# Patient Record
Sex: FEMALE | Race: WHITE
[De-identification: names, ages, dates, MRNs, and addresses within clinical notes are randomized per-mention and may not be internally consistent; named-entity substitution may affect disease eponyms.]

---

## 2018-06-08 ENCOUNTER — HOSPITAL ENCOUNTER (EMERGENCY)
Dept: HOSPITAL 91 - FTE | Age: 38
Discharge: HOME | End: 2018-06-08
Payer: MEDICAID

## 2018-06-08 ENCOUNTER — HOSPITAL ENCOUNTER (EMERGENCY)
Age: 38
Discharge: HOME | End: 2018-06-08

## 2018-06-08 DIAGNOSIS — N12: Primary | ICD-10-CM

## 2018-06-08 LAB
URINE BLOOD (DIP) POC: (no result)
URINE LEUKOCYTE EST (DIP) POC: (no result)
URINE PH (DIP) POC: 8.5 (ref 5–8.5)
URINE TOTAL PROTEIN POC: (no result)

## 2018-06-08 PROCEDURE — 81003 URINALYSIS AUTO W/O SCOPE: CPT

## 2018-06-08 PROCEDURE — 81025 URINE PREGNANCY TEST: CPT

## 2018-06-08 PROCEDURE — 99284 EMERGENCY DEPT VISIT MOD MDM: CPT

## 2018-06-08 PROCEDURE — 96372 THER/PROPH/DIAG INJ SC/IM: CPT

## 2018-06-08 PROCEDURE — 87086 URINE CULTURE/COLONY COUNT: CPT

## 2018-06-08 RX ADMIN — CEFTRIAXONE SODIUM 1 GM: 1 INJECTION, POWDER, FOR SOLUTION INTRAMUSCULAR; INTRAVENOUS at 12:30

## 2018-06-08 RX ADMIN — LIDOCAINE HYDROCHLORIDE 1 ML: 10 INJECTION, SOLUTION INFILTRATION; PERINEURAL at 12:30

## 2019-04-12 ENCOUNTER — HOSPITAL ENCOUNTER (INPATIENT)
Dept: HOSPITAL 10 - L-D | Age: 39
LOS: 6 days | Discharge: HOME | End: 2019-04-18
Attending: OBSTETRICS & GYNECOLOGY | Admitting: OBSTETRICS & GYNECOLOGY
Payer: MEDICAID

## 2019-04-12 ENCOUNTER — HOSPITAL ENCOUNTER (INPATIENT)
Dept: HOSPITAL 91 - L-D | Age: 39
LOS: 6 days | Discharge: HOME | End: 2019-04-18
Payer: MEDICAID

## 2019-04-12 VITALS
HEIGHT: 67 IN | BODY MASS INDEX: 33.56 KG/M2 | WEIGHT: 213.85 LBS | WEIGHT: 213.85 LBS | BODY MASS INDEX: 33.56 KG/M2 | HEIGHT: 67 IN

## 2019-04-12 VITALS — RESPIRATION RATE: 18 BRPM | DIASTOLIC BLOOD PRESSURE: 67 MMHG | HEART RATE: 81 BPM | SYSTOLIC BLOOD PRESSURE: 108 MMHG

## 2019-04-12 DIAGNOSIS — K83.1: ICD-10-CM

## 2019-04-12 DIAGNOSIS — O32.2XX0: ICD-10-CM

## 2019-04-12 DIAGNOSIS — Z30.2: ICD-10-CM

## 2019-04-12 DIAGNOSIS — Z3A.36: ICD-10-CM

## 2019-04-12 LAB
ADD MAN DIFF?: NO
ALANINE AMINOTRANSFERASE: 61 IU/L (ref 13–69)
ALBUMIN/GLOBULIN RATIO: 1.08
ALBUMIN: 3.7 G/DL (ref 3.3–4.9)
ALKALINE PHOSPHATASE: 151 IU/L (ref 42–121)
ANION GAP: 8 (ref 5–13)
ASPARTATE AMINO TRANSFERASE: 33 IU/L (ref 15–46)
BASOPHIL #: 0 10^3/UL (ref 0–0.1)
BASOPHILS %: 0.4 % (ref 0–2)
BILIRUBIN,DIRECT: 0 MG/DL (ref 0–0.2)
BILIRUBIN,TOTAL: 0.4 MG/DL (ref 0.2–1.3)
BLOOD UREA NITROGEN: 7 MG/DL (ref 7–20)
CALCIUM: 9.1 MG/DL (ref 8.4–10.2)
CARBON DIOXIDE: 23 MMOL/L (ref 21–31)
CHLORIDE: 104 MMOL/L (ref 97–110)
CREATININE: 0.53 MG/DL (ref 0.44–1)
EOSINOPHILS #: 0.1 10^3/UL (ref 0–0.5)
EOSINOPHILS %: 0.8 % (ref 0–7)
GLOBULIN: 3.4 G/DL (ref 1.3–3.2)
GLUCOSE: 78 MG/DL (ref 70–220)
HEMATOCRIT: 34.7 % (ref 37–47)
HEMOGLOBIN: 11.3 G/DL (ref 12–16)
HEPATITIS B SURFACE ANTIGEN: NEGATIVE
IMMATURE GRANS #M: 0.14 10^3/UL (ref 0–0.03)
IMMATURE GRANS % (M): 1.3 % (ref 0–0.43)
INR: 0.88
LYMPHOCYTES #: 2.5 10^3/UL (ref 0.8–2.9)
LYMPHOCYTES %: 23.9 % (ref 15–51)
MEAN CORPUSCULAR HEMOGLOBIN: 25.9 PG (ref 29–33)
MEAN CORPUSCULAR HGB CONC: 32.6 G/DL (ref 32–37)
MEAN CORPUSCULAR VOLUME: 79.6 FL (ref 82–101)
MEAN PLATELET VOLUME: 10.5 FL (ref 7.4–10.4)
MONOCYTE #: 0.7 10^3/UL (ref 0.3–0.9)
MONOCYTES %: 6.8 % (ref 0–11)
NEUTROPHIL #: 7 10^3/UL (ref 1.6–7.5)
NEUTROPHILS %: 66.8 % (ref 39–77)
NUCLEATED RED BLOOD CELLS #: 0 10^3/UL (ref 0–0)
NUCLEATED RED BLOOD CELLS%: 0 /100WBC (ref 0–0)
PARTIAL THROMBOPLASTIN TIME: 24.7 SEC (ref 23–35)
PLATELET COUNT: 355 10^3/UL (ref 140–415)
POTASSIUM: 3.5 MMOL/L (ref 3.5–5.1)
PROTIME: 12 SEC (ref 11.9–14.9)
PT RATIO: 0.9
RED BLOOD COUNT: 4.36 10^6/UL (ref 4.2–5.4)
RED CELL DISTRIBUTION WIDTH: 12.9 % (ref 11.5–14.5)
SODIUM: 135 MMOL/L (ref 135–144)
TOTAL PROTEIN: 7.1 G/DL (ref 6.1–8.1)
WHITE BLOOD COUNT: 10.5 10^3/UL (ref 4.8–10.8)

## 2019-04-12 PROCEDURE — 85610 PROTHROMBIN TIME: CPT

## 2019-04-12 PROCEDURE — 88302 TISSUE EXAM BY PATHOLOGIST: CPT

## 2019-04-12 PROCEDURE — 86900 BLOOD TYPING SEROLOGIC ABO: CPT

## 2019-04-12 PROCEDURE — 86850 RBC ANTIBODY SCREEN: CPT

## 2019-04-12 PROCEDURE — 76818 FETAL BIOPHYS PROFILE W/NST: CPT

## 2019-04-12 PROCEDURE — 86592 SYPHILIS TEST NON-TREP QUAL: CPT

## 2019-04-12 PROCEDURE — 76815 OB US LIMITED FETUS(S): CPT

## 2019-04-12 PROCEDURE — 86901 BLOOD TYPING SEROLOGIC RH(D): CPT

## 2019-04-12 PROCEDURE — 85730 THROMBOPLASTIN TIME PARTIAL: CPT

## 2019-04-12 PROCEDURE — 87340 HEPATITIS B SURFACE AG IA: CPT

## 2019-04-12 PROCEDURE — 85025 COMPLETE CBC W/AUTO DIFF WBC: CPT

## 2019-04-12 PROCEDURE — 80053 COMPREHEN METABOLIC PANEL: CPT

## 2019-04-12 RX ADMIN — URSODIOL 1 MG: 300 CAPSULE ORAL at 23:33

## 2019-04-12 RX ADMIN — BETAMETHASONE SODIUM PHOSPHATE AND BETAMETHASONE ACETATE SCH MG: 3; 3 INJECTION, SUSPENSION INTRA-ARTICULAR; INTRALESIONAL; INTRAMUSCULAR at 22:00

## 2019-04-12 RX ADMIN — HYDROCORTISONE 1 APPLIC: 1 OINTMENT TOPICAL at 23:34

## 2019-04-12 RX ADMIN — DIPHENHYDRAMINE HYDROCHLORIDE 1 MG: 25 CAPSULE ORAL at 23:55

## 2019-04-12 RX ADMIN — URSODIOL SCH MG: 300 CAPSULE ORAL at 23:33

## 2019-04-12 RX ADMIN — BETAMETHASONE SODIUM PHOSPHATE AND BETAMETHASONE ACETATE 1 MG: 3; 3 INJECTION, SUSPENSION INTRA-ARTICULAR; INTRALESIONAL; INTRAMUSCULAR at 22:00

## 2019-04-12 RX ADMIN — DIPHENHYDRAMINE HYDROCHLORIDE SCH MG: 25 CAPSULE ORAL at 23:55

## 2019-04-12 RX ADMIN — PYRIDOXINE HYDROCHLORIDE SCH MLS/HR: 100 INJECTION, SOLUTION INTRAMUSCULAR; INTRAVENOUS at 22:31

## 2019-04-12 RX ADMIN — PYRIDOXINE HYDROCHLORIDE 1 MLS/HR: 100 INJECTION, SOLUTION INTRAMUSCULAR; INTRAVENOUS at 22:31

## 2019-04-12 NOTE — PREOPHP
DATE OF ADMISSION: 2019

 

HISTORY OF PRESENT ILLNESS:  Ms. Thi Shaffer is a 38-year-old  4, para 3, EDC 2019 
intrauterine pregnancy at 35 weeks and 4 days gestational age, was sent from [facility] t
[facility] to be delivered at 36 weeks secondary to cholestasis of pregnancy.  Currently, the patient has m
ild generalized itching.  She declines any nausea, vomiting, shortness of breath, visual change, or e
pigastric pain.  Her vital signs are stable.  Her prenatal care took place at [facility].


 

PAST MEDICAL HISTORY:  None.

 

MEDICATIONS:  Prenatal vitamins, Actigall, Benadryl, hydrocortisone cream 1%.

 

PAST SURGICAL HISTORY:  None.

 

OBSTETRIC HISTORY:  x3 vaginal deliveries.

 

GYNECOLOGIC HISTORY:  12, regular 3 to 4 days.  Denies any sexually transmitted infections.  Sexually
 active with 1 partner.

 

SOCIAL HISTORY:  Denies any smoking, drugs or alcohol.

 

FAMILY HISTORY:  None.

 

REVIEW OF SYSTEMS:  All within normal except for history of present illness.

 

PHYSICAL EXAMINATION:

HEENT:  Within normal.

LUNGS:  CTA bilateral.

CARDIOVASCULAR:  S1, S2, regular rhythm.

ABDOMEN:  Gravid, nontender.  Negative CVA bilateral.

EXTREMITIES:  Negative edema.  No calf tenderness.

PELVIC:  Vaginal exam deferred.  Fetal heart tracing category 1.

 

SIGNIFICANT LABORATORIES:  Total bile acids 267.8, result from 04/10/2019.

 

ASSESSMENT:  Intrauterine pregnancy at 35 weeks and 4 days gestational age with cholestasis of pregna
ncy.

 

PLAN:  The patient's evaluation was discussed with Dr. Booth, who recommended delivery at 36 weeks an
d continuous fetal monitoring inhouse until delivery.  Plan is to give a steroid treatment for fetal 
lung maturity, continue Actigall, biophysical profile, EFW and fetal presentation.

 

 

Dictated By: ROSANNA RONDON/ISABELLE

DD:    2019 20:26:45

DT:    2019 21:05:09

Conf#: [id number]

DID#:  [id number]

## 2019-04-13 LAB — RAPID PLASMA REAGIN: NONREACTIVE

## 2019-04-13 RX ADMIN — FERROUS SULFATE TAB 325 MG (65 MG ELEMENTAL FE) 1 MG: 325 (65 FE) TAB at 08:18

## 2019-04-13 RX ADMIN — FERROUS SULFATE TAB 325 MG (65 MG ELEMENTAL FE) SCH MG: 325 (65 FE) TAB at 08:18

## 2019-04-13 RX ADMIN — Medication SCH TAB: at 08:18

## 2019-04-13 RX ADMIN — DIPHENHYDRAMINE HYDROCHLORIDE 1 MG: 25 CAPSULE ORAL at 05:56

## 2019-04-13 RX ADMIN — BETAMETHASONE SODIUM PHOSPHATE AND BETAMETHASONE ACETATE SCH MG: 3; 3 INJECTION, SUSPENSION INTRA-ARTICULAR; INTRALESIONAL; INTRAMUSCULAR at 22:21

## 2019-04-13 RX ADMIN — URSODIOL SCH MG: 300 CAPSULE ORAL at 13:04

## 2019-04-13 RX ADMIN — URSODIOL SCH MG: 300 CAPSULE ORAL at 08:18

## 2019-04-13 RX ADMIN — PYRIDOXINE HYDROCHLORIDE 1 MLS/HR: 100 INJECTION, SOLUTION INTRAMUSCULAR; INTRAVENOUS at 11:37

## 2019-04-13 RX ADMIN — PYRIDOXINE HYDROCHLORIDE 1 MLS/HR: 100 INJECTION, SOLUTION INTRAMUSCULAR; INTRAVENOUS at 03:13

## 2019-04-13 RX ADMIN — URSODIOL 1 MG: 300 CAPSULE ORAL at 13:04

## 2019-04-13 RX ADMIN — DIPHENHYDRAMINE HYDROCHLORIDE 1 MG: 25 CAPSULE ORAL at 11:55

## 2019-04-13 RX ADMIN — PYRIDOXINE HYDROCHLORIDE SCH MLS/HR: 100 INJECTION, SOLUTION INTRAMUSCULAR; INTRAVENOUS at 11:37

## 2019-04-13 RX ADMIN — BETAMETHASONE SODIUM PHOSPHATE AND BETAMETHASONE ACETATE 1 MG: 3; 3 INJECTION, SUSPENSION INTRA-ARTICULAR; INTRALESIONAL; INTRAMUSCULAR at 22:21

## 2019-04-13 RX ADMIN — DIPHENHYDRAMINE HYDROCHLORIDE 1 MG: 25 CAPSULE ORAL at 17:49

## 2019-04-13 RX ADMIN — Medication 1 TAB: at 08:18

## 2019-04-13 RX ADMIN — DIPHENHYDRAMINE HYDROCHLORIDE SCH MG: 25 CAPSULE ORAL at 17:49

## 2019-04-13 RX ADMIN — PYRIDOXINE HYDROCHLORIDE 1 MLS/HR: 100 INJECTION, SOLUTION INTRAMUSCULAR; INTRAVENOUS at 21:08

## 2019-04-13 RX ADMIN — PYRIDOXINE HYDROCHLORIDE SCH MLS/HR: 100 INJECTION, SOLUTION INTRAMUSCULAR; INTRAVENOUS at 21:08

## 2019-04-13 RX ADMIN — DIPHENHYDRAMINE HYDROCHLORIDE SCH MG: 25 CAPSULE ORAL at 05:56

## 2019-04-13 RX ADMIN — URSODIOL 1 MG: 300 CAPSULE ORAL at 21:07

## 2019-04-13 RX ADMIN — PYRIDOXINE HYDROCHLORIDE SCH MLS/HR: 100 INJECTION, SOLUTION INTRAMUSCULAR; INTRAVENOUS at 03:13

## 2019-04-13 RX ADMIN — URSODIOL 1 MG: 300 CAPSULE ORAL at 08:18

## 2019-04-13 RX ADMIN — URSODIOL SCH MG: 300 CAPSULE ORAL at 21:07

## 2019-04-13 RX ADMIN — DIPHENHYDRAMINE HYDROCHLORIDE SCH MG: 25 CAPSULE ORAL at 11:55

## 2019-04-13 NOTE — CONS
DATE OF ADMISSION: 04/12/2019

DATE OF CONSULTATION:  04/12/2019

 

 

 

I was notified by Dr. Chavis around 5:00 about this patient.  She was 35 weeks and 4 days on 04/12
/2019 and her bile acids have just been resulted with the total bile acid of 267.  He mentioned liver
 function tests are normal and apparently the bile acids have been done fasting.  This is a very, clemente
y high level of bile acid.  I do recommend in-house management and continue with the Actigall.  Hossein
nuous fetal heart tone monitoring.  Delivery at 36 weeks unless there is any evidence of nonreassurin
g fetal heart tone.  The reason for delivery at 36 weeks is that also severe cholestasis, which incre
ases the risk of intrauterine fetal demise.

 

 

Dictated By: RADHIKA HAILE MD

 

ST/NTS

DD:    04/13/2019 01:22:09

DT:    04/13/2019 05:29:09

Conf#: [id number]

DID#:  [id number]

CC: ROSANNA CHAVIS MD;*EndCC*

## 2019-04-14 RX ADMIN — DIPHENHYDRAMINE HYDROCHLORIDE 1 MG: 25 CAPSULE ORAL at 06:18

## 2019-04-14 RX ADMIN — URSODIOL 1 MG: 300 CAPSULE ORAL at 22:00

## 2019-04-14 RX ADMIN — PYRIDOXINE HYDROCHLORIDE 1 MLS/HR: 100 INJECTION, SOLUTION INTRAMUSCULAR; INTRAVENOUS at 14:17

## 2019-04-14 RX ADMIN — DIPHENHYDRAMINE HYDROCHLORIDE 1 MG: 25 CAPSULE ORAL at 18:14

## 2019-04-14 RX ADMIN — PYRIDOXINE HYDROCHLORIDE SCH MLS/HR: 100 INJECTION, SOLUTION INTRAMUSCULAR; INTRAVENOUS at 14:17

## 2019-04-14 RX ADMIN — URSODIOL 1 MG: 300 CAPSULE ORAL at 13:04

## 2019-04-14 RX ADMIN — PYRIDOXINE HYDROCHLORIDE SCH MLS/HR: 100 INJECTION, SOLUTION INTRAMUSCULAR; INTRAVENOUS at 05:13

## 2019-04-14 RX ADMIN — PYRIDOXINE HYDROCHLORIDE SCH MLS/HR: 100 INJECTION, SOLUTION INTRAMUSCULAR; INTRAVENOUS at 22:01

## 2019-04-14 RX ADMIN — DIPHENHYDRAMINE HYDROCHLORIDE SCH MG: 25 CAPSULE ORAL at 00:09

## 2019-04-14 RX ADMIN — Medication 1 TAB: at 08:58

## 2019-04-14 RX ADMIN — PYRIDOXINE HYDROCHLORIDE 1 MLS/HR: 100 INJECTION, SOLUTION INTRAMUSCULAR; INTRAVENOUS at 05:13

## 2019-04-14 RX ADMIN — DIPHENHYDRAMINE HYDROCHLORIDE 1 MG: 25 CAPSULE ORAL at 23:49

## 2019-04-14 RX ADMIN — DIPHENHYDRAMINE HYDROCHLORIDE SCH MG: 25 CAPSULE ORAL at 18:14

## 2019-04-14 RX ADMIN — FERROUS SULFATE TAB 325 MG (65 MG ELEMENTAL FE) 1 MG: 325 (65 FE) TAB at 08:58

## 2019-04-14 RX ADMIN — URSODIOL 1 MG: 300 CAPSULE ORAL at 08:58

## 2019-04-14 RX ADMIN — URSODIOL SCH MG: 300 CAPSULE ORAL at 08:58

## 2019-04-14 RX ADMIN — URSODIOL SCH MG: 300 CAPSULE ORAL at 13:04

## 2019-04-14 RX ADMIN — PYRIDOXINE HYDROCHLORIDE 1 MLS/HR: 100 INJECTION, SOLUTION INTRAMUSCULAR; INTRAVENOUS at 22:01

## 2019-04-14 RX ADMIN — DIPHENHYDRAMINE HYDROCHLORIDE SCH MG: 25 CAPSULE ORAL at 06:18

## 2019-04-14 RX ADMIN — DIPHENHYDRAMINE HYDROCHLORIDE SCH MG: 25 CAPSULE ORAL at 12:07

## 2019-04-14 RX ADMIN — DIPHENHYDRAMINE HYDROCHLORIDE SCH MG: 25 CAPSULE ORAL at 23:49

## 2019-04-14 RX ADMIN — DIPHENHYDRAMINE HYDROCHLORIDE 1 MG: 25 CAPSULE ORAL at 00:09

## 2019-04-14 RX ADMIN — DIPHENHYDRAMINE HYDROCHLORIDE 1 MG: 25 CAPSULE ORAL at 12:07

## 2019-04-14 RX ADMIN — Medication SCH TAB: at 08:58

## 2019-04-14 RX ADMIN — URSODIOL SCH MG: 300 CAPSULE ORAL at 22:00

## 2019-04-14 RX ADMIN — FERROUS SULFATE TAB 325 MG (65 MG ELEMENTAL FE) SCH MG: 325 (65 FE) TAB at 08:58

## 2019-04-15 VITALS — SYSTOLIC BLOOD PRESSURE: 116 MMHG | HEART RATE: 98 BPM | RESPIRATION RATE: 18 BRPM | DIASTOLIC BLOOD PRESSURE: 56 MMHG

## 2019-04-15 LAB
ADD MAN DIFF?: NO
ALANINE AMINOTRANSFERASE: 78 IU/L (ref 13–69)
ALBUMIN/GLOBULIN RATIO: 1.06
ALBUMIN: 3.2 G/DL (ref 3.3–4.9)
ALKALINE PHOSPHATASE: 112 IU/L (ref 42–121)
ANION GAP: 4 (ref 5–13)
ASPARTATE AMINO TRANSFERASE: 41 IU/L (ref 15–46)
BASOPHIL #: 0 10^3/UL (ref 0–0.1)
BASOPHILS %: 0.4 % (ref 0–2)
BILIRUBIN,DIRECT: 0 MG/DL (ref 0–0.2)
BILIRUBIN,TOTAL: 0.2 MG/DL (ref 0.2–1.3)
BLOOD UREA NITROGEN: 8 MG/DL (ref 7–20)
CALCIUM: 8.7 MG/DL (ref 8.4–10.2)
CARBON DIOXIDE: 24 MMOL/L (ref 21–31)
CHLORIDE: 105 MMOL/L (ref 97–110)
CREATININE: 0.5 MG/DL (ref 0.44–1)
EOSINOPHILS #: 0.1 10^3/UL (ref 0–0.5)
EOSINOPHILS %: 0.5 % (ref 0–7)
GLOBULIN: 3 G/DL (ref 1.3–3.2)
GLUCOSE: 83 MG/DL (ref 70–220)
HEMATOCRIT: 29.8 % (ref 37–47)
HEMOGLOBIN: 9.8 G/DL (ref 12–16)
IMMATURE GRANS #M: 0.16 10^3/UL (ref 0–0.03)
IMMATURE GRANS % (M): 1.6 % (ref 0–0.43)
INR: 0.92
LYMPHOCYTES #: 2.6 10^3/UL (ref 0.8–2.9)
LYMPHOCYTES %: 25.7 % (ref 15–51)
MEAN CORPUSCULAR HEMOGLOBIN: 26.4 PG (ref 29–33)
MEAN CORPUSCULAR HGB CONC: 32.9 G/DL (ref 32–37)
MEAN CORPUSCULAR VOLUME: 80.3 FL (ref 82–101)
MEAN PLATELET VOLUME: 10.1 FL (ref 7.4–10.4)
MONOCYTE #: 1 10^3/UL (ref 0.3–0.9)
MONOCYTES %: 10.3 % (ref 0–11)
NEUTROPHIL #: 6.1 10^3/UL (ref 1.6–7.5)
NEUTROPHILS %: 61.5 % (ref 39–77)
NUCLEATED RED BLOOD CELLS #: 0 10^3/UL (ref 0–0)
NUCLEATED RED BLOOD CELLS%: 0 /100WBC (ref 0–0)
PARTIAL THROMBOPLASTIN TIME: 23 SEC (ref 23–35)
PLATELET COUNT: 349 10^3/UL (ref 140–415)
POTASSIUM: 3.5 MMOL/L (ref 3.5–5.1)
PROTIME: 12.5 SEC (ref 11.9–14.9)
PT RATIO: 1
RED BLOOD COUNT: 3.71 10^6/UL (ref 4.2–5.4)
RED CELL DISTRIBUTION WIDTH: 13.2 % (ref 11.5–14.5)
SODIUM: 133 MMOL/L (ref 135–144)
TOTAL PROTEIN: 6.2 G/DL (ref 6.1–8.1)
WHITE BLOOD COUNT: 9.9 10^3/UL (ref 4.8–10.8)

## 2019-04-15 PROCEDURE — 0UB70ZZ EXCISION OF BILATERAL FALLOPIAN TUBES, OPEN APPROACH: ICD-10-PCS

## 2019-04-15 PROCEDURE — 0UB70ZZ EXCISION OF BILATERAL FALLOPIAN TUBES, OPEN APPROACH: ICD-10-PCS | Performed by: OBSTETRICS & GYNECOLOGY

## 2019-04-15 RX ADMIN — URSODIOL SCH MG: 300 CAPSULE ORAL at 22:09

## 2019-04-15 RX ADMIN — URSODIOL 1 MG: 300 CAPSULE ORAL at 07:53

## 2019-04-15 RX ADMIN — URSODIOL 1 MG: 300 CAPSULE ORAL at 19:30

## 2019-04-15 RX ADMIN — PYRIDOXINE HYDROCHLORIDE SCH MLS/HR: 100 INJECTION, SOLUTION INTRAMUSCULAR; INTRAVENOUS at 15:24

## 2019-04-15 RX ADMIN — URSODIOL SCH MG: 300 CAPSULE ORAL at 07:53

## 2019-04-15 RX ADMIN — DIPHENHYDRAMINE HYDROCHLORIDE 1 MG: 50 INJECTION, SOLUTION INTRAMUSCULAR; INTRAVENOUS at 22:30

## 2019-04-15 RX ADMIN — FERROUS SULFATE TAB 325 MG (65 MG ELEMENTAL FE) SCH MG: 325 (65 FE) TAB at 07:54

## 2019-04-15 RX ADMIN — DIPHENHYDRAMINE HYDROCHLORIDE SCH MG: 25 CAPSULE ORAL at 19:31

## 2019-04-15 RX ADMIN — URSODIOL SCH MG: 300 CAPSULE ORAL at 19:30

## 2019-04-15 RX ADMIN — Medication 1 MLS/HR: at 19:05

## 2019-04-15 RX ADMIN — FERROUS SULFATE TAB 325 MG (65 MG ELEMENTAL FE) 1 MG: 325 (65 FE) TAB at 07:54

## 2019-04-15 RX ADMIN — KETOROLAC TROMETHAMINE 1 MG: 30 INJECTION, SOLUTION INTRAMUSCULAR at 20:34

## 2019-04-15 RX ADMIN — DIPHENHYDRAMINE HYDROCHLORIDE SCH MG: 25 CAPSULE ORAL at 05:57

## 2019-04-15 RX ADMIN — PYRIDOXINE HYDROCHLORIDE 1 MLS/HR: 100 INJECTION, SOLUTION INTRAMUSCULAR; INTRAVENOUS at 15:24

## 2019-04-15 RX ADMIN — Medication 1 MLS/HR: at 19:31

## 2019-04-15 RX ADMIN — Medication SCH TAB: at 07:53

## 2019-04-15 RX ADMIN — CEFAZOLIN SODIUM 1 MLS/HR: 2 SOLUTION INTRAVENOUS at 17:45

## 2019-04-15 RX ADMIN — PYRIDOXINE HYDROCHLORIDE 1 MLS/HR: 100 INJECTION, SOLUTION INTRAMUSCULAR; INTRAVENOUS at 05:58

## 2019-04-15 RX ADMIN — DIPHENHYDRAMINE HYDROCHLORIDE SCH MG: 25 CAPSULE ORAL at 19:30

## 2019-04-15 RX ADMIN — Medication SCH MLS/HR: at 22:09

## 2019-04-15 RX ADMIN — DIPHENHYDRAMINE HYDROCHLORIDE 1 MG: 25 CAPSULE ORAL at 19:31

## 2019-04-15 RX ADMIN — DIPHENHYDRAMINE HYDROCHLORIDE 1 MG: 25 CAPSULE ORAL at 19:30

## 2019-04-15 RX ADMIN — Medication 1 TAB: at 07:53

## 2019-04-15 RX ADMIN — PYRIDOXINE HYDROCHLORIDE SCH MLS/HR: 100 INJECTION, SOLUTION INTRAMUSCULAR; INTRAVENOUS at 05:58

## 2019-04-15 RX ADMIN — Medication 1 MLS/HR: at 22:09

## 2019-04-15 RX ADMIN — URSODIOL 1 MG: 300 CAPSULE ORAL at 22:09

## 2019-04-15 RX ADMIN — DIPHENHYDRAMINE HYDROCHLORIDE 1 MG: 25 CAPSULE ORAL at 05:57

## 2019-04-15 NOTE — OPPN
Date/Time of Note


Date/Time of Note


DATE: 4/15/19 


TIME: 18:35





Operative Report


Planned Procedure


Procedure date


Apr 15, 2019


Procedure(s)


primary low transverse CD, wit bilateral tubal salpingectomy


Performed by


see signature line


Assistant:  SORAYA AJ M.D.


2nd Assistant


none


Anesthesiologist:  JAMIL HEREDIA MD


Pre-procedure diagnosis


Intrauterine pregnancy at 36 weeks and  days gestational age with cholestasis of


pregnancy, fetal malpresentation, desire CD with btl


                 [id number]
Anesthesia Type:  Kebdm5b
spinal








Post-Procedure


Post-procedure diagnosis


same


Findings


a viable male apgar 8/9 weight 2,745 grams, transverse presentation (head 


towards left, back up), normal uterus tubes and ovaries


Estimated Blood Loss:  500 - 600 mls (500)


Specimen(s)


left and right fallopian tubes


Grafts/Implant(s)


none


Complication(s)


none











ROSANNA CHAVIS MD           Apr 15, 2019 18:38

## 2019-04-15 NOTE — PAC
Date/Time of Note


Date/Time of Note


DATE: 4/15/19 


TIME: 19:00





Post-Anesthesia Notes


Post-Anesthesia Note


Last documented vital signs





Vital Signs


  Date      Temp  Pulse  Resp  B/P (MAP)   Pulse Ox  O2          O2 Flow    FiO2


Time                                                 Delivery    Rate


   4/12/19  99.1     81    18      108/67            Room Air


     19:46                           (81)





Activity:  WNL


Respiratory function:  WNL


Cardiovascular function:  WNL


Mental status:  Baseline


Pain reasonably controlled:  Yes


Hydration appropriate:  Yes


Nausea/Vomiting absent:  Yes


Comments


BP:122/56, P:88, Spo2:100%, T:98,8











JAMIL HEREDIA MD              Apr 15, 2019 19:00

## 2019-04-15 NOTE — PREAC
Date/Time of Note


Date/Time of Note


DATE: 4/15/19 


TIME: 17:38





Anesthesia Eval and Record


Evaluation


Time Pre-Procedure Interview


DATE: 4/15/19 


TIME: 17:38


Age


38


Sex


female


NPO:  8 hrs


Preoperative diagnosis


IUP


Planned procedure


Csection





Past Medical History


Past Medical History:  None





Surgery & Anesthesia Issues


No known issue





Meds


Anticoagulation:  No


Beta Blocker within 24 hr:  No


Reason Beta Blocker not given:  Pt. not on B-Blocker


Active Scripts


Ciprofloxacin Hcl* (Ciprofloxacin Hcl*) 500 Mg Tablet, 500 MG PO BID for 10 


Days, TAB


   Prov:ALVAREZ NIXON PA-C         6/8/18


Reported Medications


Diphenhydramine Hcl (Banophen) 25 Mg Capsule, 25 MG PO Q6 for itching, CAP


   4/12/19


Ursodiol* (Actigall*) 300 Mg Cap, 300 MG PO TID, #90 CAP


   4/12/19


Prenatal Vits W-Ca,Fe,Fa(<1MG) (Prenatal) 1 Tab Tablet


   9/30/10





Current Medications


Lactated Ringer's 1,000 ml @  125 mls/hr Q8H IV  Last administered on 4/15/19at 


15:24; Admin Dose 125 MLS/HR;  Start 4/12/19 at 20:24


Prenat Multivit/ /Iron/Folic Ac (Prenatal) 1 tab DAILY PO  Last 


administered on 4/15/19at 07:53; Admin Dose 1 TAB;  Start 4/13/19 at 09:00


Ferrous Sulfate (Ferrous Sulfate (Ec)) 325 mg DAILY PO  Last administered on 


4/15/19at 07:54; Admin Dose 325 MG;  Start 4/13/19 at 09:00


Acetaminophen (Tylenol Tab) 650 mg Q4H  PRN PO .PAIN OR TEMP;  Start 4/12/19 at 


20:30


Diphenhydramine HCl (Benadryl) 25 mg Q6 PO  Last administered on 4/15/19at 


05:57; Admin Dose 25 MG;  Start 4/13/19 at 00:00


Ursodiol (Actigall) 300 mg TID PO  Last administered on 4/15/19at 07:53; Admin 


Dose 300 MG;  Start 4/12/19 at 22:00


Hydrocortisone (Hydrocortisone 1% Oint) 1 applic Q4  PRN TOP ITCHING Last 


administered on 4/12/19at 23:34; Admin Dose 1 APPLIC;  Start 4/12/19 at 20:30


Butorphanol Tartrate (Stadol) 2 mg Q2H  PRN IV .PAIN;  Start 4/15/19 at 09:00


Lidocaine (Xylocaine 1% (Mpf)) 30 ml ONCE PRN INJ .EPISIOTOMY;  Start 4/15/19 at


09:00


Oxytocin/Lactated Ringer's 500 ml @  500 mls/hr ONCE POST PARTUM IV ;  Start 4/1 5/19 at 09:00


Oxytocin/Lactated Ringer's 500 ml @  125 mls/hr POST PARTUM IV ;  Start 4/15/19 


at 09:00


Ibuprofen (Motrin) 600 mg ONCE PRN PO .PAIN 1-5;  Start 4/15/19 at 09:00


Oxytocin/Lactated Ringer's 500 ml @ 0 mls/hr ONCE PRN IV .VAGINAL BLEEDING;  


Start 4/15/19 at 09:00


Methylergonovine Maleate (Methergine) 0.2 mg ONCE PRN IM .VAGINAL BLEEDING;  


Start 4/15/19 at 09:00


Carboprost Tromethamine (Hemabate) 250 mcg ONCE PRN IM .VAGINAL BLEEDING;  Start


4/15/19 at 09:00


Misoprostol (Cytotec) 1,000 mcg ONCE PRN VA .VAGINAL BLEEDING;  Start 4/15/19 at


09:00


Meds reviewed:  Yes





Allergies


Coded Allergies:  


     No Known Allergy (Unverified , 4/14/19)


Allergies Reviewed:  Yes





Labs/Studies


Labs Reviewed:  Reviewed by anesthesiologist


Result Diagram:  


4/15/19 1042                                                                    


           4/15/19 1042





Laboratory Tests


4/15/19 10:42











Blood Bank


                  Test
                         4/15/19
10:39


                  Antibody Screen               NEGATIVE


                  Blood Type                    A POSITIVE


                  Rh Immune Globulin Candidate  NO





Pregnancy test:  Positive


Studies:  ECG





Pre-procedure Exam


Last vitals





Vital Signs


  Date      Temp  Pulse  Resp  B/P (MAP)   Pulse Ox  O2          O2 Flow    FiO2


Time                                                 Delivery    Rate


   4/12/19  99.1     81    18      108/67            Room Air


     19:46                           (81)





Airway:  Adequate mouth opening, Adequate thyromental dist


Mallampati:  Mallampati II


Teeth:  Normal


Lung:  Normal


Heart:  Normal





ASA Physical Status


ASA physical status:  2


Emergency:  None





Planned Anesthetic


Neuraxial:  Spinal





Planned Pain Management


Sub-arachniod narcotics, Parenteral pain med





Pre-operative Attestations


Prior to commencing anesthesia and surgery, the patient was re-evaluated, there 


was verification of:


*The patient's identity


*The results of appropriate recent lab work and preoperative vital signs


*The above evaluation not changing prior to induction


*Anesthetic plan, risk benefits, alternative and complications discussed with 


patient/family; questions answered; patient/family understands, accepts and 


wishes to proceed.











JAMIL HEREDIA MD              Apr 15, 2019 17:40

## 2019-04-15 NOTE — QN
Documentation


Comment


patient seen and evaluated


no complaints


vs stable afebrile


ab soft nt gravid


extremity no edema no calf tenderness


fhr cat 1





sono: transverse lie





 a/Intrauterine pregnancy at 36 weeks and  days gestational age with cholestasis


of pregnancy, fetal malpresentation





p/ keep patient npo


schedule for CD today











ROSANNA CHAVIS MD           Apr 15, 2019 10:15

## 2019-04-15 NOTE — HPN
Date/Time of Note


Date/Time of Note


DATE: 4/15/19 


TIME: 17:42





Interval H&P Admission Note


Pt. seen H&P reviewed:  Systems changes noted below


Intrauterine pregnancy at 36 weeks and  days gestational age with cholestasis of


pregnancy, fetal malpresentation, desire CD with btl 


r/b/a explained all question answered











ROSANNA CHAVIS MD           Apr 15, 2019 17:42

## 2019-04-16 VITALS — RESPIRATION RATE: 17 BRPM | HEART RATE: 59 BPM | SYSTOLIC BLOOD PRESSURE: 102 MMHG | DIASTOLIC BLOOD PRESSURE: 59 MMHG

## 2019-04-16 VITALS — DIASTOLIC BLOOD PRESSURE: 63 MMHG | SYSTOLIC BLOOD PRESSURE: 116 MMHG | RESPIRATION RATE: 19 BRPM | HEART RATE: 80 BPM

## 2019-04-16 VITALS — DIASTOLIC BLOOD PRESSURE: 62 MMHG | RESPIRATION RATE: 18 BRPM | HEART RATE: 71 BPM | SYSTOLIC BLOOD PRESSURE: 108 MMHG

## 2019-04-16 VITALS — SYSTOLIC BLOOD PRESSURE: 104 MMHG | DIASTOLIC BLOOD PRESSURE: 66 MMHG | RESPIRATION RATE: 18 BRPM | HEART RATE: 82 BPM

## 2019-04-16 VITALS — SYSTOLIC BLOOD PRESSURE: 110 MMHG | DIASTOLIC BLOOD PRESSURE: 70 MMHG | RESPIRATION RATE: 18 BRPM | HEART RATE: 68 BPM

## 2019-04-16 LAB
ADD MAN DIFF?: NO
BASOPHIL #: 0 10^3/UL (ref 0–0.1)
BASOPHILS %: 0.2 % (ref 0–2)
EOSINOPHILS #: 0.1 10^3/UL (ref 0–0.5)
EOSINOPHILS %: 0.4 % (ref 0–7)
HEMATOCRIT: 27.6 % (ref 37–47)
HEMOGLOBIN: 8.9 G/DL (ref 12–16)
IMMATURE GRANS #M: 0.09 10^3/UL (ref 0–0.03)
IMMATURE GRANS % (M): 0.6 % (ref 0–0.43)
LYMPHOCYTES #: 2.3 10^3/UL (ref 0.8–2.9)
LYMPHOCYTES %: 15.6 % (ref 15–51)
MEAN CORPUSCULAR HEMOGLOBIN: 25.9 PG (ref 29–33)
MEAN CORPUSCULAR HGB CONC: 32.2 G/DL (ref 32–37)
MEAN CORPUSCULAR VOLUME: 80.2 FL (ref 82–101)
MEAN PLATELET VOLUME: 10.3 FL (ref 7.4–10.4)
MONOCYTE #: 1.1 10^3/UL (ref 0.3–0.9)
MONOCYTES %: 7.2 % (ref 0–11)
NEUTROPHIL #: 11.1 10^3/UL (ref 1.6–7.5)
NEUTROPHILS %: 76 % (ref 39–77)
NUCLEATED RED BLOOD CELLS #: 0 10^3/UL (ref 0–0)
NUCLEATED RED BLOOD CELLS%: 0 /100WBC (ref 0–0)
PLATELET COUNT: 335 10^3/UL (ref 140–415)
RED BLOOD COUNT: 3.44 10^6/UL (ref 4.2–5.4)
RED CELL DISTRIBUTION WIDTH: 13.4 % (ref 11.5–14.5)
WHITE BLOOD COUNT: 14.6 10^3/UL (ref 4.8–10.8)

## 2019-04-16 RX ADMIN — CEFAZOLIN SODIUM 1 MLS/HR: 2 SOLUTION INTRAVENOUS at 11:36

## 2019-04-16 RX ADMIN — Medication 1 APPLIC: at 20:55

## 2019-04-16 RX ADMIN — PYRIDOXINE HYDROCHLORIDE SCH MLS/HR: 100 INJECTION, SOLUTION INTRAMUSCULAR; INTRAVENOUS at 12:24

## 2019-04-16 RX ADMIN — FERROUS SULFATE TAB 325 MG (65 MG ELEMENTAL FE) 1 MG: 325 (65 FE) TAB at 20:55

## 2019-04-16 RX ADMIN — PYRIDOXINE HYDROCHLORIDE 1 MLS/HR: 100 INJECTION, SOLUTION INTRAMUSCULAR; INTRAVENOUS at 22:30

## 2019-04-16 RX ADMIN — IBUPROFEN SCH MG: 600 TABLET ORAL at 18:00

## 2019-04-16 RX ADMIN — IBUPROFEN SCH MG: 600 TABLET ORAL at 05:08

## 2019-04-16 RX ADMIN — IBUPROFEN 1 MG: 600 TABLET ORAL at 23:53

## 2019-04-16 RX ADMIN — Medication SCH MLS/HR: at 00:04

## 2019-04-16 RX ADMIN — URSODIOL SCH MG: 300 CAPSULE ORAL at 20:55

## 2019-04-16 RX ADMIN — PYRIDOXINE HYDROCHLORIDE SCH MLS/HR: 100 INJECTION, SOLUTION INTRAMUSCULAR; INTRAVENOUS at 22:30

## 2019-04-16 RX ADMIN — IBUPROFEN SCH MG: 600 TABLET ORAL at 00:00

## 2019-04-16 RX ADMIN — PYRIDOXINE HYDROCHLORIDE 1 MLS/HR: 100 INJECTION, SOLUTION INTRAMUSCULAR; INTRAVENOUS at 14:30

## 2019-04-16 RX ADMIN — URSODIOL 1 MG: 300 CAPSULE ORAL at 09:07

## 2019-04-16 RX ADMIN — URSODIOL SCH MG: 300 CAPSULE ORAL at 09:07

## 2019-04-16 RX ADMIN — IBUPROFEN 1 MG: 600 TABLET ORAL at 05:08

## 2019-04-16 RX ADMIN — URSODIOL SCH MG: 300 CAPSULE ORAL at 12:23

## 2019-04-16 RX ADMIN — IBUPROFEN 1 MG: 600 TABLET ORAL at 12:00

## 2019-04-16 RX ADMIN — OXYCODONE HYDROCHLORIDE AND ACETAMINOPHEN 1 TAB: 5; 325 TABLET ORAL at 20:56

## 2019-04-16 RX ADMIN — IBUPROFEN SCH MG: 600 TABLET ORAL at 12:00

## 2019-04-16 RX ADMIN — Medication 1 MLS/HR: at 00:04

## 2019-04-16 RX ADMIN — IBUPROFEN 1 MG: 600 TABLET ORAL at 00:00

## 2019-04-16 RX ADMIN — IBUPROFEN 1 MG: 600 TABLET ORAL at 18:00

## 2019-04-16 RX ADMIN — URSODIOL 1 MG: 300 CAPSULE ORAL at 20:55

## 2019-04-16 RX ADMIN — PYRIDOXINE HYDROCHLORIDE SCH MLS/HR: 100 INJECTION, SOLUTION INTRAMUSCULAR; INTRAVENOUS at 14:30

## 2019-04-16 RX ADMIN — CEFAZOLIN SODIUM 1 MLS/HR: 2 SOLUTION INTRAVENOUS at 01:57

## 2019-04-16 RX ADMIN — IBUPROFEN SCH MG: 600 TABLET ORAL at 23:53

## 2019-04-16 RX ADMIN — FERROUS SULFATE TAB 325 MG (65 MG ELEMENTAL FE) SCH MG: 325 (65 FE) TAB at 20:55

## 2019-04-16 RX ADMIN — PYRIDOXINE HYDROCHLORIDE 1 MLS/HR: 100 INJECTION, SOLUTION INTRAMUSCULAR; INTRAVENOUS at 12:24

## 2019-04-16 RX ADMIN — URSODIOL 1 MG: 300 CAPSULE ORAL at 12:23

## 2019-04-16 RX ADMIN — DOCUSATE SODIUM AND SENNOSIDES 1 TAB: 8.6; 5 TABLET, FILM COATED ORAL at 20:55

## 2019-04-16 RX ADMIN — CEFAZOLIN SODIUM 1 MLS/HR: 2 SOLUTION INTRAVENOUS at 17:52

## 2019-04-16 RX ADMIN — DOCUSATE SODIUM AND SENNOSIDES PRN TAB: 8.6; 5 TABLET, FILM COATED ORAL at 20:55

## 2019-04-16 NOTE — QN
Documentation


Comment


progress note pod 1


patient seen and evaluated


no complaints


kimble clear


vs stable afebrile


ab dressing clean/ dry no distention nt


extremity no edema no calf tenderness





a sp cd with btl pod 1 stable afebrile





p/ iron supplement


encourage ambulation











ROSANNA CHAVIS MD           Apr 16, 2019 11:04

## 2019-04-16 NOTE — OPR
DATE OF OPERATION:  04/15/2019

 

 

PREOPERATIVE DIAGNOSIS:  Intrauterine pregnancy at 36 weeks' gestation age with cholestasis of pregna
ncy, fetal malpresentation, desires a  delivery with bilateral tubal sterilization.

 

POSTOPERATIVE DIAGNOSES:  Intrauterine pregnancy at 36 weeks' gestational age with cholestasis of pre
gnancy, fetal malpresentation, desires a  delivery with bilateral tubal sterilization.

 

OPERATION PERFORMED:  Repeat low transverse  delivery with bilateral tubal salpingectomy.

 

SURGEON:  Brock Barksdale MD

 

ASSISTANT:  Randell Mcdaniel MD

 

ANESTHESIA:  Spinal.

 

COMPLICATIONS:  None.

 

ESTIMATED BLOOD LOSS:  500 mL.

 

PATHOLOGY:  Right and left fallopian tube.

 

FINDINGS:  A viable male, Apgar 8 and 9 respectively at 1 and 5 minutes, weight 2745 grams, transvers
e presentation.  Fetal head toward the left with back up.  Normal uterus, tubes and ovaries.

 

DESCRIPTION OF PROCEDURE:  After explaining the risks, benefits and alternatives, the patient had con
sent signed in chart, the patient was taken to the operating room where spinal anesthesia was found t
o be adequate.  She was then prepared and draped in normal sterile fashion in dorsal position with a 
leftward tilt.  A Pfannenstiel skin incision was then made with a scalpel and carried to the underlyi
ng fascia.  The fascia was incised in the midline.  Incision was extended laterally with Moran scissor
s.  The superior aspect of the fascial incision was grasped with curved clamps, elevated and the unde
rlying rectus muscles dissected off bluntly.  Attention was then turned to the inferior aspect of the
 incision which in similar fashion was grasped, tented up with curved clamps and the rectus muscle di
ssected off bluntly.  The rectus was  in midline, peritoneum identified, tented up, and yelena
ply with Metzenbaum scissors.  This incision was extended superiorly and inferiorly with good visuali
zation of the bladder.  The bladder blade was then inserted and the vesicouterine identified, grasped
 with pickups and sharply with Metzenbaum scissors.  This incision was extended laterally and a bladd
er flap created digitally.  The bladder blade was then reinserted and lower segment incised in transv
erse fashion with the scalpel.  Uterine incision was extended laterally.  The bladder blade was remov
ed and the infant's head delivered atraumatically.  The nose and mouth were suctioned, cord clamped a
nd cut.  The infant was handed off to waiting pediatrician.  The placenta was then removed.  The uter
us was exteriorized and cleared of all clots and debris.  The uterine incision was repaired with 1-0 
chromic in a running locked fashion.  A second set.  A second layer of same suture was used for imbri
cation obtaining excellent hemostasis.

 

At this point, using a Ashwin, left fallopian tube was grasped and excised with x2 plain gut.  Good 
hemostasis was noted.  Similarly, the other fallopian tube was removed.  The uterus was returned to t
he abdomen.  The gutters were cleared of all clots.  The peritoneum and rectus abdominis muscles were
 reapproximated with 2-0 Vicryl.  The fascia was reapproximated with 0 Vicryl in a running fashion.  
The subcutaneous tissue was reapproximated with 2-0 plain gut in a running fashion.  The skin was david
sed with absorbable staples.  The patient tolerated procedure well.  All counts were correct.  The pa
tient was taken to PACU in stable condition.

 

 

Dictated By: BROCK RONDON/ISABELLE

DD:    2019 09:31:03

DT:    2019 14:55:55

Conf#: [id number]

DID#:  [id number]

## 2019-04-17 VITALS — HEART RATE: 77 BPM | SYSTOLIC BLOOD PRESSURE: 116 MMHG | RESPIRATION RATE: 18 BRPM | DIASTOLIC BLOOD PRESSURE: 66 MMHG

## 2019-04-17 VITALS — SYSTOLIC BLOOD PRESSURE: 109 MMHG | HEART RATE: 65 BPM | DIASTOLIC BLOOD PRESSURE: 67 MMHG | RESPIRATION RATE: 17 BRPM

## 2019-04-17 VITALS — HEART RATE: 57 BPM | RESPIRATION RATE: 17 BRPM | DIASTOLIC BLOOD PRESSURE: 72 MMHG | SYSTOLIC BLOOD PRESSURE: 110 MMHG

## 2019-04-17 VITALS — HEART RATE: 81 BPM | RESPIRATION RATE: 19 BRPM | DIASTOLIC BLOOD PRESSURE: 69 MMHG | SYSTOLIC BLOOD PRESSURE: 115 MMHG

## 2019-04-17 RX ADMIN — URSODIOL SCH MG: 300 CAPSULE ORAL at 08:59

## 2019-04-17 RX ADMIN — FERROUS SULFATE TAB 325 MG (65 MG ELEMENTAL FE) 1 MG: 325 (65 FE) TAB at 08:59

## 2019-04-17 RX ADMIN — IBUPROFEN SCH MG: 600 TABLET ORAL at 05:35

## 2019-04-17 RX ADMIN — FERROUS SULFATE TAB 325 MG (65 MG ELEMENTAL FE) 1 MG: 325 (65 FE) TAB at 20:55

## 2019-04-17 RX ADMIN — IBUPROFEN 1 MG: 600 TABLET ORAL at 05:35

## 2019-04-17 RX ADMIN — IBUPROFEN SCH MG: 600 TABLET ORAL at 17:35

## 2019-04-17 RX ADMIN — URSODIOL 1 MG: 300 CAPSULE ORAL at 20:55

## 2019-04-17 RX ADMIN — URSODIOL 1 MG: 300 CAPSULE ORAL at 13:00

## 2019-04-17 RX ADMIN — URSODIOL SCH MG: 300 CAPSULE ORAL at 13:00

## 2019-04-17 RX ADMIN — DIPHENHYDRAMINE HYDROCHLORIDE 1 MG: 25 CAPSULE ORAL at 11:06

## 2019-04-17 RX ADMIN — FERROUS SULFATE TAB 325 MG (65 MG ELEMENTAL FE) SCH MG: 325 (65 FE) TAB at 08:59

## 2019-04-17 RX ADMIN — URSODIOL SCH MG: 300 CAPSULE ORAL at 20:55

## 2019-04-17 RX ADMIN — IBUPROFEN 1 MG: 600 TABLET ORAL at 17:35

## 2019-04-17 RX ADMIN — PYRIDOXINE HYDROCHLORIDE SCH MLS/HR: 100 INJECTION, SOLUTION INTRAMUSCULAR; INTRAVENOUS at 06:23

## 2019-04-17 RX ADMIN — IBUPROFEN SCH MG: 600 TABLET ORAL at 11:51

## 2019-04-17 RX ADMIN — URSODIOL 1 MG: 300 CAPSULE ORAL at 08:59

## 2019-04-17 RX ADMIN — PYRIDOXINE HYDROCHLORIDE 1 MLS/HR: 100 INJECTION, SOLUTION INTRAMUSCULAR; INTRAVENOUS at 14:30

## 2019-04-17 RX ADMIN — OXYCODONE HYDROCHLORIDE AND ACETAMINOPHEN 1 TAB: 5; 325 TABLET ORAL at 11:56

## 2019-04-17 RX ADMIN — FERROUS SULFATE TAB 325 MG (65 MG ELEMENTAL FE) SCH MG: 325 (65 FE) TAB at 20:55

## 2019-04-17 RX ADMIN — IBUPROFEN 1 MG: 600 TABLET ORAL at 11:51

## 2019-04-17 RX ADMIN — PYRIDOXINE HYDROCHLORIDE 1 MLS/HR: 100 INJECTION, SOLUTION INTRAMUSCULAR; INTRAVENOUS at 06:23

## 2019-04-17 RX ADMIN — PYRIDOXINE HYDROCHLORIDE SCH MLS/HR: 100 INJECTION, SOLUTION INTRAMUSCULAR; INTRAVENOUS at 14:30

## 2019-04-18 VITALS — SYSTOLIC BLOOD PRESSURE: 106 MMHG | DIASTOLIC BLOOD PRESSURE: 64 MMHG | HEART RATE: 74 BPM | RESPIRATION RATE: 18 BRPM

## 2019-04-18 VITALS — RESPIRATION RATE: 17 BRPM | DIASTOLIC BLOOD PRESSURE: 68 MMHG | SYSTOLIC BLOOD PRESSURE: 120 MMHG | HEART RATE: 71 BPM

## 2019-04-18 RX ADMIN — IBUPROFEN SCH MG: 600 TABLET ORAL at 11:53

## 2019-04-18 RX ADMIN — IBUPROFEN SCH MG: 600 TABLET ORAL at 00:04

## 2019-04-18 RX ADMIN — IBUPROFEN SCH MG: 600 TABLET ORAL at 05:48

## 2019-04-18 RX ADMIN — DOCUSATE SODIUM AND SENNOSIDES PRN TAB: 8.6; 5 TABLET, FILM COATED ORAL at 10:31

## 2019-04-18 RX ADMIN — DOCUSATE SODIUM AND SENNOSIDES 1 TAB: 8.6; 5 TABLET, FILM COATED ORAL at 10:31

## 2019-04-18 RX ADMIN — IBUPROFEN 1 MG: 600 TABLET ORAL at 05:48

## 2019-04-18 RX ADMIN — FERROUS SULFATE TAB 325 MG (65 MG ELEMENTAL FE) SCH MG: 325 (65 FE) TAB at 10:31

## 2019-04-18 RX ADMIN — URSODIOL 1 MG: 300 CAPSULE ORAL at 10:31

## 2019-04-18 RX ADMIN — FERROUS SULFATE TAB 325 MG (65 MG ELEMENTAL FE) 1 MG: 325 (65 FE) TAB at 10:31

## 2019-04-18 RX ADMIN — CLOSTRIDIUM TETANI TOXOID ANTIGEN (FORMALDEHYDE INACTIVATED), CORYNEBACTERIUM DIPHTHERIAE TOXOID ANTIGEN (FORMALDEHYDE INACTIVATED), BORDETELLA PERTUSSIS TOXOID ANTIGEN (GLUTARALDEHYDE INACTIVATED), BORDETELLA PERTUSSIS FILAMENTOUS HEMAGGLUTININ ANTIGEN (FORMALDEHYDE INACTIVATED), BORDETELLA PERTUSSIS PERTACTIN ANTIGEN, AND BORDETELLA PERTUSSIS FIMBRIAE 2/3 ANTIGEN 1 ML: 5; 2; 2.5; 5; 3; 5 INJECTION, SUSPENSION INTRAMUSCULAR at 09:00

## 2019-04-18 RX ADMIN — URSODIOL SCH MG: 300 CAPSULE ORAL at 10:31

## 2019-04-18 RX ADMIN — IBUPROFEN 1 MG: 600 TABLET ORAL at 00:04

## 2019-04-18 RX ADMIN — IBUPROFEN 1 MG: 600 TABLET ORAL at 11:53

## 2019-04-18 RX ADMIN — URSODIOL SCH MG: 300 CAPSULE ORAL at 14:20

## 2019-04-18 RX ADMIN — URSODIOL 1 MG: 300 CAPSULE ORAL at 14:20

## 2019-04-18 NOTE — DS
DATE OF ADMISSION: 2019

DATE OF DISCHARGE: 2019

 

PRIMARY DIAGNOSIS:  Intrauterine pregnancy at 36 weeks' gestational age with cholestasis of pregnancy
, fetal malpresentation, desires  delivery with bilateral tubal sterilization.

 

PROCEDURE:  Repeat low transverse  delivery with bilateral tubal salpingectomy.

 

CONDITION ON DISCHARGE:  Stable.

 

ACTIVITY:  None per vagina, no heavy lifting x6 weeks.

 

DIET:  Regular.

 

MEDICATIONS ON DISCHARGE:  Motrin, iron and Colace.  Continue Actigall.

 

DISCHARGE SUMMARY:  The patient underwent a repeat low transverse  delivery with bilateral tu
bal sterilization on 04/15/2019.  She had a viable male, Apgar 8 and 9 respectively at 1 and 5 minute
s, weight 2745 grams.  She had an uneventful postop day 1, 2, and 3.  Her incision is clean, dry, and
 intact.  She is ambulating, tolerating diet, positive flatulence, positive bowel movement.  She will
 be discharged home today and follow up in the clinic in 2 weeks for postpartum/postop care.

 

 

Dictated By: ROSANNA RONDON/ISABELLE

DD:    2019 11:38:07

DT:    2019 21:04:05

Conf#: [id number]

DID#:  [id number]

CC: TRISH AWAD MD; ROSANNA CHAVIS MD;*EndCC*

## 2019-04-18 NOTE — PD.PPDC
OB/GYN Discharge Instruction


Condition


                 [id number]
Patient Condition:  Falxy7h
Good








Diet


                Kngrf9Ft
Diet:  Rnhku9d
Resume Regular Diet








Activity/Restrictions


              [id number]
Activity:      Cpbuu3l
Normal Activity


                                        May Shower


              Elodz5Zh
Restrictions:  Pjjnl4w
No Exercising


                                        No Lifting


                                        No Driving


                                        No Sexual Activity


                                        Nothing in the Vagina


                                        No [place]


                                        No Tampons, douche








Follow-up


Follow-up with Physician:  2, Week/Weeks





Return to clinic for


      Veemc4Oy
GYN Instructions:       Zrjmv2g
Fever greater than 101


                                         Chills


                                         Worsening abdominal pain


                                         Excessive Vaginal Bleeding


                                         More than 2 pads per hour


                                         Unable to tolerate diet


      Zbyxc4Ph
OB Instructions:        Pnvac4l
Breast Tenderness


                                         Depression


                                         Blurried Vision


                                         Headache


      Zwsmp2Ud
Surgical Instructions:  Nrwyr9u
Incisional Drainage


                                         Incisional Redness














ROSANNA CHAVIS MD           Apr 18, 2019 11:38

## 2019-04-19 NOTE — DELSUM
===================================

Delivery Summary A-C

===================================

Datetime Report Generated by CPN: 2019 16:09

   

   

===================================

DELIVERY PERSONNEL

===================================

   

Circulator:  Pamela Aparicio

   

===================================

MATERNAL INFORMATION

===================================

   

Delivery Anesthesia:  Spinal

Medications in Delivery:  SEE ANESTHESIA NOTE

Delivery QBL (ml):  600

Placenta Cultured:  No

Maternal Complications:  Other

Other Maternal Complications:  CHOLESTASIS, TRANSVERSE BABY 

RN Comments:  elevated bile acids

   

===================================

LABOR SUMMARY

===================================

   

EDC:  2019 00:00

No. Babies in Womb:  1

 Attempted:  No

Labor Anesthesia:  None

   

===================================

LABOR INFORMATION

===================================

   

Reason for Induction:  Other

Reason for Induction- Other:  elevated bile acids

Oxytocin:  N/A

Group B Beta Strep:  Negative

Antibiotics # of Doses:  1

Antibiotics Time of Last Dose:  04/15/2019 17:45

Steroids Given:  Full Course

Reason Steroids Not Administered:  Not Applicable

   

===================================

MEMBRANES

===================================

   

Membranes Rupture Method:  Artificial

Rupture of Membranes:  04/15/2019 18:09

Length of Rupture (hr):  0.00

Amniotic Fluid Color:  Clear

Amniotic Fluid Amount:  Small

Amniotic Fluid Odor:  Normal

   

===================================

STAGES OF LABOR

===================================

   

Stage 3 hr:  0

Stage 3 min:  1

   

===================================

CSECTION DELIVERY

===================================

   

Primary Indication:  Trans/Complex Presentatio

CSection Urgency:  Non Elective

CSection Incidence:  Primary

Labor:  No Labor

Elective:  Nonelective

CSection Incision:  Lower Uterine Transverse

Sterilization Procedure:  Daniele

   

===================================

BABY A INFORMATION

===================================

   

Infant Delivery Date/Time:  04/15/2019 18:09

Method of Delivery:  

Born in Route :  No

:  N/A

Forceps:  N/A

Vacuum Extraction:  N/A

Shoulder Dystocia :  No

   

===================================

SHOULDER DYSTOCIA BABY A

===================================

   

Infant Delivery Date/Time:  04/15/2019 18:09

   

===================================

PRESENTATION/POSITION BABY A

===================================

   

Presentation:  Compound

Cephalic Presentation:  N/A

Vertex Position:  N/A

Breech Presentation:  N/A

   

===================================

PLACENTA INFORMATION BABY A

===================================

   

Placenta Delivery Time :  04/15/2019 18:10

Placenta Method of Delivery:  Spontaneous

Placenta Status:  Delivered

   

===================================

APGAR SCORES BABY A

===================================

   

Heart Rate 1 min:  >100 bpm

Resp Effort 1 min:  Good Cry

Reflex Irritability 1 min:  Cough/Sneeze/Pulls Away

Muscle Tone 1 min:  Active Motion

Color 1 min:  Blue/Pale

Resuscitation Effort 1 min:  Tactile Stimulation

APGAR SCORE 1 MIN:  8

Heart Rate 5 min:  >100 bpm

Resp Effort 5 min:  Good Cry

Reflex Irritability 5 min:  Cough/Sneeze/Pulls Away

Muscle Tone 5 min:  Active Motion

Color 5 min:  Body Pink, Extremit Blue

Resuscitation Effort 5 min:  Tactile Stimulation

APGAR SCORE 5 MIN:  9

   

===================================

INFANT INFORMATION BABY A

===================================

   

Gestational Age at Delivery:  36.0

Gestational Status:  Late - 34- 36.6 Weeks

Infant Outcome :  Liveborn

Infant Condition :  Stable

Infant Sex:  Male

   

===================================

IDENTIFICATION/MEDS BABY A

===================================

   

ID Band Number:  [id number]

ID Band Location:  Right Leg; Left Arm



Sensor Applied:  Yes

Sensor Number:  [id number]

Sensor Location :  Cord Clamp

Vitamin K Given :  Not Given

Erythromycin Given:  Not Given

   

===================================

WEIGHT/LENGTH BABY A

===================================

   

Infant Birthweight (gm):  2745

Infant Weight (lb):  6

Infant Weight (oz):  1

Infant Length (in):  19.50

Infant Length (cm):  49.53

   

===================================

CORD INFORMATION BABY A

===================================

   

No. Cord Vessels:  3

Nuchal Cord :  N/A

Cord Blood Taken:  Yes

Infant Suction:  Mouth; Nose

   (Annotations: Data stored by [facility] on behalf of user)

   

===================================

ASSESSMENT BABY A

===================================

   

Infant Complications:  None

Physical Findings at Delivery:  Within Normal Limits

Physical Findings- Other:  at 1854 RT JERRY bedside to evaluate  infant

Infant Respirations:  Appears Normal

Neonatologist/ALS Called :  Yes

Infant Care By:  RT

Transferred To:  Remains with Mother